# Patient Record
Sex: FEMALE | Race: BLACK OR AFRICAN AMERICAN | NOT HISPANIC OR LATINO | Employment: OTHER | URBAN - METROPOLITAN AREA
[De-identification: names, ages, dates, MRNs, and addresses within clinical notes are randomized per-mention and may not be internally consistent; named-entity substitution may affect disease eponyms.]

---

## 2017-11-29 ENCOUNTER — PROCEDURE VISIT (OUTPATIENT)
Dept: OBSTETRICS AND GYNECOLOGY | Facility: CLINIC | Age: 72
End: 2017-11-29

## 2017-11-29 ENCOUNTER — APPOINTMENT (OUTPATIENT)
Dept: WOMENS IMAGING | Facility: HOSPITAL | Age: 72
End: 2017-11-29

## 2017-11-29 DIAGNOSIS — Z12.31 VISIT FOR SCREENING MAMMOGRAM: Primary | ICD-10-CM

## 2017-11-29 PROCEDURE — G0202 SCR MAMMO BI INCL CAD: HCPCS | Performed by: RADIOLOGY

## 2017-11-29 PROCEDURE — G0202 SCR MAMMO BI INCL CAD: HCPCS | Performed by: OBSTETRICS & GYNECOLOGY

## 2018-10-18 ENCOUNTER — TELEPHONE (OUTPATIENT)
Dept: OBSTETRICS AND GYNECOLOGY | Facility: CLINIC | Age: 73
End: 2018-10-18

## 2018-10-18 NOTE — TELEPHONE ENCOUNTER
Pt called, requesting to have a dexa scan done this year when she comes in for her mammo. Please advise.     Pt callback: [phone]

## 2018-10-18 NOTE — TELEPHONE ENCOUNTER
I am not certain if her insurance will cover it.  Per last records, her DXA looked good but can you ask her if she has ever been diagnosed with osteoporosis or osteopenia or if she has been on a medication other than Vit D/Calcium?

## 2018-10-18 NOTE — TELEPHONE ENCOUNTER
According to Dr. Daily's note from 2016, the patient had osteopenia diagnosed in 2013.  Her last DEXA did show improvement.  Her lowest T score was negative 1.8 in her spine. Based on recommendations from the [employer] College of obstetrics and gynecology, a patient with osteopenia should have a repeat in 5 years, which would be 2021.  Changes in medications or medical diagnosis may change the decision to get a DXA sooner (for example if she has been on prolonged steroids or depending on how well controlled her diabetes is).  If she feels strongly about the exam, I am happy to order it but cannot guarantee insurance coverage.  Thanks!

## 2018-10-18 NOTE — TELEPHONE ENCOUNTER
10/18/18  Patient states she has never been diagnosed with osteoporosis or osteopenia. Patient states she has and only taken over the counter VIT d and calcium. Patient states that from her understanding she is suppose to get a dexa every 2 years.

## 2018-10-19 NOTE — TELEPHONE ENCOUNTER
Patient was informed of message and stated she is going to speak to her PCP regarding DEXA. Patient schedule for mammo and annual on 12/6/18

## 2018-12-06 ENCOUNTER — OFFICE VISIT (OUTPATIENT)
Dept: OBSTETRICS AND GYNECOLOGY | Facility: CLINIC | Age: 73
End: 2018-12-06

## 2018-12-06 VITALS
SYSTOLIC BLOOD PRESSURE: 149 MMHG | BODY MASS INDEX: 33.38 KG/M2 | DIASTOLIC BLOOD PRESSURE: 84 MMHG | HEIGHT: 60 IN | WEIGHT: 170 LBS | HEART RATE: 72 BPM

## 2018-12-06 DIAGNOSIS — Z01.419 WELL WOMAN EXAM WITH ROUTINE GYNECOLOGICAL EXAM: Primary | ICD-10-CM

## 2018-12-06 PROCEDURE — G0101 CA SCREEN;PELVIC/BREAST EXAM: HCPCS | Performed by: OBSTETRICS & GYNECOLOGY

## 2018-12-06 RX ORDER — POTASSIUM CHLORIDE 1.5 G/1.77G
POWDER, FOR SOLUTION ORAL EVERY 12 HOURS SCHEDULED
COMMUNITY

## 2018-12-06 RX ORDER — LANCETS 33 GAUGE
EACH MISCELLANEOUS
COMMUNITY

## 2018-12-06 RX ORDER — SIMVASTATIN 20 MG
TABLET ORAL
COMMUNITY

## 2018-12-06 RX ORDER — MELATONIN: COMMUNITY

## 2018-12-06 RX ORDER — PRAVASTATIN SODIUM 20 MG
TABLET ORAL
COMMUNITY

## 2018-12-06 RX ORDER — NYSTATIN 100000 [USP'U]/G
POWDER TOPICAL 3 TIMES DAILY
Qty: 60 G | Refills: 0 | Status: SHIPPED | OUTPATIENT
Start: 2018-12-06

## 2018-12-06 NOTE — PROGRESS NOTES
Chief Complaint   Patient presents with   • Annual Exam     no pap record (hysterectomy). Last 17        Bev Rosenthal is a 73 y.o.  who presents for an annual examination     Pap history:  Last pap: NA  Prior abnormal paps: no  DXA:  yes,  - hip was > negative 1 but spine was low.    Her last DEXA did show improvement.  Her lowest T score was negative 1.8 in her spine. Based on recommendations from the American College of obstetrics and gynecology, a patient with osteopenia should have a repeat in 5 years, which would be .    Colonoscopy:  yes, due in   Mammogram: 12/10/18 - scheduled  STDs  Sexually active: no  History of STDs: no  Menopause:  Age: unsure -   Vasomotor symptoms: no    Is patient's family or personal history significant for any risks for BRCA? no    Past Medical History:   Diagnosis Date   • Cataract    • Diabetes (CMS/HCC)    • Hyperlipemia    • Hypertension      Past Surgical History:   Procedure Laterality Date   • CATARACT EXTRACTION     • HYSTERECTOMY       OB History    Para Term  AB Living   2 2 2     2   SAB TAB Ectopic Molar Multiple Live Births             2      # Outcome Date GA Lbr Mainor/2nd Weight Sex Delivery Anes PTL Lv   2 Term      Vag-Spont   RIN   1 Term      Vag-Spont   RIN        Social History     Tobacco Use   • Smoking status: Never Smoker   • Smokeless tobacco: Never Used   Substance Use Topics   • Alcohol use: No   • Drug use: No     Family History   Problem Relation Age of Onset   • Hypertension Mother    • Diabetes Mother    • Heart disease Father    • Diabetes Sister    • Diabetes Brother    • Breast cancer Neg Hx    • Ovarian cancer Neg Hx    • Uterine cancer Neg Hx    • Colon cancer Neg Hx    • Pulmonary embolism Neg Hx    • Deep vein thrombosis Neg Hx      Current Outpatient Medications on File Prior to Visit   Medication Sig Dispense Refill   • amLODIPine-benazepril (LOTREL) 10-20 MG per capsule      • atenolol-chlorthalidone  (TENORETIC) 100-25 MG per tablet      • atorvastatin (LIPITOR) 40 MG tablet      • cholecalciferol (VITAMIN D3) 1000 units tablet Vitamin D3 1,000 unit capsule   once a day     • Ergocalciferol (VITAMIN D2) 2000 UNITS tablet Take  by mouth.     • KLOR-CON 20 MEQ CR tablet      • metFORMIN (GLUCOPHAGE) 1000 MG tablet      • ONETOUCH DELICA LANCETS 33G misc OneTouch Delica Lancets 33 gauge   USE TWO TIMES A DAY     • potassium chloride (KLOR-CON) 20 MEQ packet Every 12 (Twelve) Hours.     • pravastatin (PRAVACHOL) 20 MG tablet pravastatin 20 mg tablet     • simvastatin (ZOCOR) 20 MG tablet simvastatin 20 mg tablet     • VITAMIN B COMPLEX-C PO vitamin B complex   once a day     • vitamin B-12 (CYANOCOBALAMIN) 100 MCG tablet Take 50 mcg by mouth Daily.     • [DISCONTINUED] glipiZIDE (GLUCOTROL) 5 MG ER tablet        No current facility-administered medications on file prior to visit.      No Known Allergies     Review of Systems   Constitutional: Negative for chills, fever and unexpected weight change.   HENT: Negative for congestion, nosebleeds and sore throat.    Eyes: Negative for visual disturbance.   Respiratory: Negative for cough, chest tightness and shortness of breath.    Cardiovascular: Negative for chest pain and palpitations.   Gastrointestinal: Negative for abdominal pain, constipation, diarrhea, nausea and vomiting.   Endocrine: Negative for polyuria.   Genitourinary: Negative for difficulty urinating, dyspareunia, dysuria, frequency, genital sores, hematuria, menstrual problem, pelvic pain, urgency, vaginal bleeding, vaginal discharge and vaginal pain.   Musculoskeletal: Negative for arthralgias and joint swelling.   Skin: Negative for color change and rash.   Neurological: Negative for dizziness, light-headedness and headaches.   Hematological: Does not bruise/bleed easily.   Psychiatric/Behavioral: Negative for dysphoric mood. The patient is not nervous/anxious.        OBJECTIVE:   Vitals:    12/06/18  "1451   BP: 149/84   Pulse: 72   Weight: 77.1 kg (170 lb)   Height: 152.4 cm (60\")      Physical Exam   Constitutional: She is oriented to person, place, and time. She appears well-developed and well-nourished. No distress.   HENT:   Head: Normocephalic and atraumatic.   Eyes: EOM are normal. No scleral icterus.   Neck: Normal range of motion. Neck supple. No thyromegaly present.   Cardiovascular: Normal rate and regular rhythm. Exam reveals no gallop and no friction rub.   No murmur heard.  Pulmonary/Chest: Effort normal and breath sounds normal. No respiratory distress. She has no wheezes. She has no rales. She exhibits no tenderness. Right breast exhibits no inverted nipple. Left breast exhibits no inverted nipple. Breasts are symmetrical. There is no breast swelling.   Abdominal: Soft. Bowel sounds are normal. She exhibits no distension. There is no tenderness. There is no guarding.   Genitourinary: Vagina normal. There is no rash, tenderness, lesion, injury or Bartholin's cyst on the right labia. There is no rash, tenderness, lesion, injury or Bartholin's cyst on the left labia. Uterus is absent.   Cervix is absent. Right adnexum displays no mass, no tenderness and no fullness. Right adnexum is present.Left adnexum displays no mass, no tenderness and no fullness. Left adnexum is present.No vaginal discharge found.   Genitourinary Comments: Hypopigmentation in inguinal folds   Musculoskeletal: She exhibits no edema or deformity.   Neurological: She is alert and oriented to person, place, and time.   Skin: Skin is warm and dry. She is not diaphoretic.   Psychiatric: She has a normal mood and affect. Her speech is normal and behavior is normal. Judgment and thought content normal. Cognition and memory are normal.       ASSESSMENT/PLAN:  Well woman counseling/screening:    Cervical cancer screening:    Reports no h/o cervical dysplasia   The patient has completed pap smear screening.    Screening guidelines " discussed with patient  Breast cancer screening:    Mammogram scheduled   Breast self awareness encouraged  Colonscopy:   UTD  DXA   Reviewed options and management. Pt given FAQ ACOG regarding osteoporosis and bone density screening  Family history    does not demonstrate need for genetics referral   Healthy lifestyle counseling:   return for routine annual checkups   Elevated BP, pt asymptomatic.  Encouraged compliance with anti-hypertensive therapy and follow up  Reviewed skin changes, possible intetriginous candidiasis.  Will send Rx but follow up in 4 weeks and if no improvement consider biopsy

## 2018-12-10 ENCOUNTER — PROCEDURE VISIT (OUTPATIENT)
Dept: OBSTETRICS AND GYNECOLOGY | Facility: CLINIC | Age: 73
End: 2018-12-10

## 2018-12-10 ENCOUNTER — APPOINTMENT (OUTPATIENT)
Dept: WOMENS IMAGING | Facility: HOSPITAL | Age: 73
End: 2018-12-10

## 2018-12-10 DIAGNOSIS — Z12.31 VISIT FOR SCREENING MAMMOGRAM: Primary | ICD-10-CM

## 2018-12-10 PROCEDURE — 77067 SCR MAMMO BI INCL CAD: CPT | Performed by: RADIOLOGY

## 2018-12-10 PROCEDURE — 77067 SCR MAMMO BI INCL CAD: CPT | Performed by: OBSTETRICS & GYNECOLOGY

## 2018-12-18 ENCOUNTER — TELEPHONE (OUTPATIENT)
Dept: OBSTETRICS AND GYNECOLOGY | Facility: CLINIC | Age: 73
End: 2018-12-18

## 2018-12-18 NOTE — TELEPHONE ENCOUNTER
----- Message from Margarita Craft MD sent at 12/11/2018  4:50 PM EST -----  Please let patient know she had a benign mammogram. She has scattered areas of fibroglandular density in her breasts.  Follow up in one year.    -Patient informed of results.

## 2019-08-13 VITALS
SYSTOLIC BLOOD PRESSURE: 115 MMHG | SYSTOLIC BLOOD PRESSURE: 120 MMHG | HEART RATE: 66 BPM | WEIGHT: 167 LBS | HEART RATE: 79 BPM | HEART RATE: 82 BPM | DIASTOLIC BLOOD PRESSURE: 77 MMHG | DIASTOLIC BLOOD PRESSURE: 57 MMHG | SYSTOLIC BLOOD PRESSURE: 155 MMHG | HEART RATE: 84 BPM | DIASTOLIC BLOOD PRESSURE: 83 MMHG | DIASTOLIC BLOOD PRESSURE: 65 MMHG | DIASTOLIC BLOOD PRESSURE: 90 MMHG | HEART RATE: 80 BPM | OXYGEN SATURATION: 98 % | RESPIRATION RATE: 17 BRPM | OXYGEN SATURATION: 93 % | HEART RATE: 85 BPM | OXYGEN SATURATION: 99 % | SYSTOLIC BLOOD PRESSURE: 143 MMHG | SYSTOLIC BLOOD PRESSURE: 121 MMHG | RESPIRATION RATE: 16 BRPM | HEART RATE: 71 BPM | RESPIRATION RATE: 24 BRPM | DIASTOLIC BLOOD PRESSURE: 68 MMHG | TEMPERATURE: 97.8 F | RESPIRATION RATE: 20 BRPM | SYSTOLIC BLOOD PRESSURE: 118 MMHG | RESPIRATION RATE: 25 BRPM | RESPIRATION RATE: 18 BRPM | OXYGEN SATURATION: 100 % | SYSTOLIC BLOOD PRESSURE: 129 MMHG | OXYGEN SATURATION: 95 % | OXYGEN SATURATION: 97 % | HEART RATE: 72 BPM | TEMPERATURE: 96.9 F | DIASTOLIC BLOOD PRESSURE: 63 MMHG | SYSTOLIC BLOOD PRESSURE: 112 MMHG | RESPIRATION RATE: 12 BRPM | DIASTOLIC BLOOD PRESSURE: 64 MMHG | SYSTOLIC BLOOD PRESSURE: 151 MMHG | SYSTOLIC BLOOD PRESSURE: 125 MMHG | RESPIRATION RATE: 22 BRPM

## 2019-08-13 PROBLEM — Z86.010 SURVEILLANCE DUE TO PRIOR COLONIC NEOPLASIA: Status: ACTIVE | Noted: 2019-08-14

## 2019-08-13 PROBLEM — Z86.010 PERSONAL HISTORY OF COLONIC POLYPS: Status: ACTIVE | Noted: 2019-08-14

## 2019-08-14 ENCOUNTER — AMBULATORY SURGICAL CENTER (AMBULATORY)
Dept: URBAN - METROPOLITAN AREA SURGERY 17 | Facility: SURGERY | Age: 74
End: 2019-08-14
Payer: MEDICARE

## 2019-08-14 DIAGNOSIS — K64.8 OTHER HEMORRHOIDS: ICD-10-CM

## 2019-08-14 DIAGNOSIS — Z86.010 PERSONAL HISTORY OF COLONIC POLYPS: ICD-10-CM

## 2019-08-14 DIAGNOSIS — Z09 ENCOUNTER FOR FOLLOW-UP EXAMINATION AFTER COMPLETED TREATMEN: ICD-10-CM

## 2019-08-14 DIAGNOSIS — K64.4 RESIDUAL HEMORRHOIDAL SKIN TAGS: ICD-10-CM

## 2019-08-14 DIAGNOSIS — K57.30 DIVERTICULOSIS OF LARGE INTESTINE WITHOUT PERFORATION OR ABS: ICD-10-CM

## 2019-08-14 PROCEDURE — G0105 COLORECTAL SCRN; HI RISK IND: HCPCS | Performed by: INTERNAL MEDICINE

## 2019-08-14 NOTE — SERVICEHPINOTES
74-year-old female who has no GI complaints. Family history is negative. She does however have a history of polyps and is due for a follow-up colonoscopy, her last colonoscopy was 5 years ago.

## 2019-12-11 ENCOUNTER — PROCEDURE VISIT (OUTPATIENT)
Dept: OBSTETRICS AND GYNECOLOGY | Facility: CLINIC | Age: 74
End: 2019-12-11

## 2019-12-11 ENCOUNTER — APPOINTMENT (OUTPATIENT)
Dept: WOMENS IMAGING | Facility: HOSPITAL | Age: 74
End: 2019-12-11

## 2019-12-11 DIAGNOSIS — Z82.62 FAMILY HISTORY OF OSTEOPOROSIS: Primary | ICD-10-CM

## 2019-12-11 DIAGNOSIS — Z78.0 POST-MENOPAUSAL: ICD-10-CM

## 2019-12-11 DIAGNOSIS — Z00.00 PREVENTATIVE HEALTH CARE: ICD-10-CM

## 2019-12-11 DIAGNOSIS — Z12.31 VISIT FOR SCREENING MAMMOGRAM: Primary | ICD-10-CM

## 2019-12-11 PROCEDURE — 77080 DXA BONE DENSITY AXIAL: CPT | Performed by: OBSTETRICS & GYNECOLOGY

## 2019-12-11 PROCEDURE — 77063 BREAST TOMOSYNTHESIS BI: CPT | Performed by: OBSTETRICS & GYNECOLOGY

## 2019-12-11 PROCEDURE — 77067 SCR MAMMO BI INCL CAD: CPT | Performed by: OBSTETRICS & GYNECOLOGY

## 2019-12-11 PROCEDURE — 77063 BREAST TOMOSYNTHESIS BI: CPT | Performed by: RADIOLOGY

## 2019-12-11 PROCEDURE — 77067 SCR MAMMO BI INCL CAD: CPT | Performed by: RADIOLOGY

## 2019-12-18 ENCOUNTER — TELEPHONE (OUTPATIENT)
Dept: OBSTETRICS AND GYNECOLOGY | Facility: CLINIC | Age: 74
End: 2019-12-18

## 2019-12-18 NOTE — TELEPHONE ENCOUNTER
----- Message from Margarita Craft MD sent at 12/18/2019 10:51 AM EST -----  Please let patient know she had a benign mammogram. Follow up in one year.    (based on media tab) Letter with mammogram results was mailed by mammogram office on 12/12/19

## 2019-12-30 ENCOUNTER — TELEPHONE (OUTPATIENT)
Dept: OBSTETRICS AND GYNECOLOGY | Age: 74
End: 2019-12-30

## 2020-12-14 ENCOUNTER — PROCEDURE VISIT (OUTPATIENT)
Dept: OBSTETRICS AND GYNECOLOGY | Facility: CLINIC | Age: 75
End: 2020-12-14

## 2020-12-14 ENCOUNTER — APPOINTMENT (OUTPATIENT)
Dept: WOMENS IMAGING | Facility: HOSPITAL | Age: 75
End: 2020-12-14

## 2020-12-14 DIAGNOSIS — Z12.31 VISIT FOR SCREENING MAMMOGRAM: Primary | ICD-10-CM

## 2020-12-14 PROCEDURE — 77063 BREAST TOMOSYNTHESIS BI: CPT | Performed by: OBSTETRICS & GYNECOLOGY

## 2020-12-14 PROCEDURE — 77063 BREAST TOMOSYNTHESIS BI: CPT | Performed by: RADIOLOGY

## 2020-12-14 PROCEDURE — 77067 SCR MAMMO BI INCL CAD: CPT | Performed by: OBSTETRICS & GYNECOLOGY

## 2020-12-14 PROCEDURE — 77067 SCR MAMMO BI INCL CAD: CPT | Performed by: RADIOLOGY

## 2021-12-15 ENCOUNTER — APPOINTMENT (OUTPATIENT)
Dept: WOMENS IMAGING | Facility: HOSPITAL | Age: 76
End: 2021-12-15

## 2021-12-15 ENCOUNTER — PROCEDURE VISIT (OUTPATIENT)
Dept: OBSTETRICS AND GYNECOLOGY | Facility: CLINIC | Age: 76
End: 2021-12-15

## 2021-12-15 DIAGNOSIS — Z12.31 VISIT FOR SCREENING MAMMOGRAM: Primary | ICD-10-CM

## 2021-12-15 PROCEDURE — 77067 SCR MAMMO BI INCL CAD: CPT | Performed by: OBSTETRICS & GYNECOLOGY

## 2021-12-15 PROCEDURE — 77063 BREAST TOMOSYNTHESIS BI: CPT | Performed by: RADIOLOGY

## 2021-12-15 PROCEDURE — 77063 BREAST TOMOSYNTHESIS BI: CPT | Performed by: OBSTETRICS & GYNECOLOGY

## 2021-12-15 PROCEDURE — 77067 SCR MAMMO BI INCL CAD: CPT | Performed by: RADIOLOGY

## 2022-02-10 ENCOUNTER — TELEPHONE (OUTPATIENT)
Dept: OBSTETRICS AND GYNECOLOGY | Facility: CLINIC | Age: 77
End: 2022-02-10

## 2022-12-28 ENCOUNTER — PROCEDURE VISIT (OUTPATIENT)
Dept: OBSTETRICS AND GYNECOLOGY | Facility: CLINIC | Age: 77
End: 2022-12-28

## 2022-12-28 ENCOUNTER — APPOINTMENT (OUTPATIENT)
Dept: WOMENS IMAGING | Facility: HOSPITAL | Age: 77
End: 2022-12-28
Payer: MEDICARE

## 2022-12-28 DIAGNOSIS — Z12.31 VISIT FOR SCREENING MAMMOGRAM: Primary | ICD-10-CM

## 2022-12-28 PROCEDURE — 77063 BREAST TOMOSYNTHESIS BI: CPT | Performed by: OBSTETRICS & GYNECOLOGY

## 2022-12-28 PROCEDURE — 77063 BREAST TOMOSYNTHESIS BI: CPT | Performed by: RADIOLOGY

## 2022-12-28 PROCEDURE — 77067 SCR MAMMO BI INCL CAD: CPT | Performed by: RADIOLOGY

## 2022-12-28 PROCEDURE — 77067 SCR MAMMO BI INCL CAD: CPT | Performed by: OBSTETRICS & GYNECOLOGY

## 2024-10-24 VITALS
DIASTOLIC BLOOD PRESSURE: 61 MMHG | DIASTOLIC BLOOD PRESSURE: 66 MMHG | SYSTOLIC BLOOD PRESSURE: 152 MMHG | SYSTOLIC BLOOD PRESSURE: 123 MMHG | RESPIRATION RATE: 18 BRPM | DIASTOLIC BLOOD PRESSURE: 68 MMHG | SYSTOLIC BLOOD PRESSURE: 154 MMHG | DIASTOLIC BLOOD PRESSURE: 55 MMHG | SYSTOLIC BLOOD PRESSURE: 135 MMHG | HEART RATE: 67 BPM | SYSTOLIC BLOOD PRESSURE: 103 MMHG | SYSTOLIC BLOOD PRESSURE: 143 MMHG | RESPIRATION RATE: 19 BRPM | SYSTOLIC BLOOD PRESSURE: 122 MMHG | SYSTOLIC BLOOD PRESSURE: 151 MMHG | TEMPERATURE: 97 F | TEMPERATURE: 97.3 F | SYSTOLIC BLOOD PRESSURE: 153 MMHG | HEART RATE: 82 BPM | RESPIRATION RATE: 13 BRPM | RESPIRATION RATE: 15 BRPM | RESPIRATION RATE: 16 BRPM | HEART RATE: 69 BPM | SYSTOLIC BLOOD PRESSURE: 151 MMHG | SYSTOLIC BLOOD PRESSURE: 152 MMHG | DIASTOLIC BLOOD PRESSURE: 69 MMHG | HEART RATE: 76 BPM | DIASTOLIC BLOOD PRESSURE: 52 MMHG | HEART RATE: 80 BPM | OXYGEN SATURATION: 100 % | RESPIRATION RATE: 18 BRPM | RESPIRATION RATE: 16 BRPM | DIASTOLIC BLOOD PRESSURE: 61 MMHG | DIASTOLIC BLOOD PRESSURE: 60 MMHG | HEART RATE: 68 BPM | DIASTOLIC BLOOD PRESSURE: 62 MMHG | HEIGHT: 60 IN | RESPIRATION RATE: 14 BRPM | DIASTOLIC BLOOD PRESSURE: 66 MMHG | HEART RATE: 92 BPM | RESPIRATION RATE: 14 BRPM | DIASTOLIC BLOOD PRESSURE: 69 MMHG | SYSTOLIC BLOOD PRESSURE: 115 MMHG | OXYGEN SATURATION: 99 % | RESPIRATION RATE: 15 BRPM | DIASTOLIC BLOOD PRESSURE: 76 MMHG | HEART RATE: 92 BPM | DIASTOLIC BLOOD PRESSURE: 62 MMHG | OXYGEN SATURATION: 99 % | DIASTOLIC BLOOD PRESSURE: 77 MMHG | DIASTOLIC BLOOD PRESSURE: 52 MMHG | HEART RATE: 76 BPM | HEIGHT: 60 IN | HEART RATE: 68 BPM | WEIGHT: 163 LBS | SYSTOLIC BLOOD PRESSURE: 103 MMHG | RESPIRATION RATE: 24 BRPM | SYSTOLIC BLOOD PRESSURE: 122 MMHG | RESPIRATION RATE: 24 BRPM | SYSTOLIC BLOOD PRESSURE: 153 MMHG | SYSTOLIC BLOOD PRESSURE: 108 MMHG | SYSTOLIC BLOOD PRESSURE: 135 MMHG | DIASTOLIC BLOOD PRESSURE: 58 MMHG | HEART RATE: 82 BPM | RESPIRATION RATE: 20 BRPM | DIASTOLIC BLOOD PRESSURE: 77 MMHG | DIASTOLIC BLOOD PRESSURE: 76 MMHG | RESPIRATION RATE: 19 BRPM | DIASTOLIC BLOOD PRESSURE: 55 MMHG | HEART RATE: 71 BPM | HEART RATE: 80 BPM | TEMPERATURE: 97.3 F | SYSTOLIC BLOOD PRESSURE: 108 MMHG | SYSTOLIC BLOOD PRESSURE: 123 MMHG | WEIGHT: 163 LBS | DIASTOLIC BLOOD PRESSURE: 58 MMHG | DIASTOLIC BLOOD PRESSURE: 68 MMHG | DIASTOLIC BLOOD PRESSURE: 60 MMHG | OXYGEN SATURATION: 100 % | RESPIRATION RATE: 20 BRPM | DIASTOLIC BLOOD PRESSURE: 53 MMHG | SYSTOLIC BLOOD PRESSURE: 154 MMHG | SYSTOLIC BLOOD PRESSURE: 143 MMHG | SYSTOLIC BLOOD PRESSURE: 115 MMHG | RESPIRATION RATE: 13 BRPM | TEMPERATURE: 97 F | DIASTOLIC BLOOD PRESSURE: 53 MMHG | HEART RATE: 71 BPM | HEART RATE: 67 BPM | HEART RATE: 69 BPM

## 2024-10-29 ENCOUNTER — AMBULATORY SURGICAL CENTER (AMBULATORY)
Dept: URBAN - METROPOLITAN AREA SURGERY 17 | Facility: SURGERY | Age: 79
End: 2024-10-29
Payer: MEDICARE

## 2024-10-29 ENCOUNTER — AMBULATORY SURGICAL CENTER (AMBULATORY)
Age: 79
End: 2024-10-29
Payer: MEDICARE

## 2024-10-29 DIAGNOSIS — Z86.0100 PERSONAL HISTORY OF COLON POLYPS, UNSPECIFIED: ICD-10-CM

## 2024-10-29 DIAGNOSIS — Z09 ENCOUNTER FOR FOLLOW-UP EXAMINATION AFTER COMPLETED TREATMEN: ICD-10-CM

## 2024-10-29 DIAGNOSIS — K57.30 DIVERTICULOSIS OF LARGE INTESTINE WITHOUT PERFORATION OR ABS: ICD-10-CM

## 2024-10-29 PROCEDURE — G0105 COLORECTAL SCRN; HI RISK IND: HCPCS | Performed by: INTERNAL MEDICINE
